# Patient Record
Sex: FEMALE | Race: BLACK OR AFRICAN AMERICAN | NOT HISPANIC OR LATINO | Employment: UNEMPLOYED | URBAN - METROPOLITAN AREA
[De-identification: names, ages, dates, MRNs, and addresses within clinical notes are randomized per-mention and may not be internally consistent; named-entity substitution may affect disease eponyms.]

---

## 2022-03-01 ENCOUNTER — OFFICE VISIT (OUTPATIENT)
Dept: FAMILY MEDICINE CLINIC | Facility: CLINIC | Age: 32
End: 2022-03-01

## 2022-03-01 VITALS
TEMPERATURE: 97.3 F | OXYGEN SATURATION: 98 % | HEIGHT: 61 IN | WEIGHT: 186 LBS | SYSTOLIC BLOOD PRESSURE: 142 MMHG | BODY MASS INDEX: 35.12 KG/M2 | HEART RATE: 80 BPM | DIASTOLIC BLOOD PRESSURE: 80 MMHG

## 2022-03-01 DIAGNOSIS — I10 PRIMARY HYPERTENSION: Primary | ICD-10-CM

## 2022-03-01 DIAGNOSIS — Z11.59 ENCOUNTER FOR HEPATITIS C SCREENING TEST FOR LOW RISK PATIENT: ICD-10-CM

## 2022-03-01 DIAGNOSIS — L70.0 ACNE VULGARIS: ICD-10-CM

## 2022-03-01 DIAGNOSIS — E66.01 MORBID (SEVERE) OBESITY DUE TO EXCESS CALORIES: ICD-10-CM

## 2022-03-01 DIAGNOSIS — R53.82 CHRONIC FATIGUE: ICD-10-CM

## 2022-03-01 PROCEDURE — 99203 OFFICE O/P NEW LOW 30 MIN: CPT | Performed by: FAMILY MEDICINE

## 2022-03-01 RX ORDER — SPIRONOLACTONE 25 MG/1
25 TABLET ORAL DAILY
Qty: 30 TABLET | Refills: 11 | Status: SHIPPED | OUTPATIENT
Start: 2022-03-01

## 2022-03-01 NOTE — PROGRESS NOTES
Subjective   Zeinab Blunt is a 31 y.o. female.     Chief Complaint   Patient presents with   • Establish Care       History of Present Illness   New patient today, establishing care.  She has history of hypertension.  Severe acne and is supposed to be on spironolactone by her allergist she wants for me to refill that.  She is also on Retin-A for her acne    The following portions of the patient's history were reviewed and updated as appropriate: allergies, current medications, past family history, past medical history, past social history, past surgical history and problem list.    Past Medical History:   Diagnosis Date   • Gestational hypertension    • Preeclampsia    • Pregnancy 2016   • Pulmonary edema     With last delivery Had PIH       Past Surgical History:   Procedure Laterality Date   •  SECTION N/A 2016    Procedure:  SECTION REPEAT;  Surgeon: Justice Tiwari MD;  Location: [facility] LABOR DELIVERY;  Service:        Family History   Family history unknown: Yes       Social History     Socioeconomic History   • Marital status: Single   Tobacco Use   • Smoking status: Never Smoker   • Smokeless tobacco: Never Used   Substance and Sexual Activity   • Alcohol use: No   • Drug use: Yes     Types: Marijuana   • Sexual activity: Yes     Partners: Male     Birth control/protection: None       Current Outpatient Medications on File Prior to Visit   Medication Sig Dispense Refill   • ibuprofen (ADVIL,MOTRIN) 600 MG tablet Take 1 tablet by mouth every 6 (six) hours as needed for mild pain (1-3) or moderate pain (4-6). 24 tablet 1     No current facility-administered medications on file prior to visit.       Review of Systems   Constitutional: Positive for fatigue.   Skin:        acne       Recent Results (from the past 4704 hour(s))   HCG, SERUM, QUALITATIVE    Collection Time: 10/12/21 11:21 AM    Specimen type and source: Serum, Blood   Result Value Ref Range    HCG Qualitative  "Negative Negative   Pregnancy, Urine -    Collection Time: 10/25/21  8:23 AM    Specimen: Urine    Specimen type and source: Urine, Urine   Result Value Ref Range    HCG Qualitative Negative Negative     Objective   Vitals:    03/01/22 1417   BP: 142/80   BP Location: Right arm   Patient Position: Sitting   Pulse: 80   Temp: 97.3 °F (36.3 °C)   SpO2: 98%   Weight: 84.4 kg (186 lb)   Height: 154.9 cm (61\")     Body mass index is 35.14 kg/m².  Physical Exam  Vitals and nursing note reviewed.   Constitutional:       General: She is not in acute distress.     Appearance: She is well-developed. She is not diaphoretic.   Cardiovascular:      Rate and Rhythm: Normal rate and regular rhythm.   Pulmonary:      Effort: Pulmonary effort is normal. No respiratory distress.      Breath sounds: Normal breath sounds. No wheezing.   Skin:     Comments: Acne           Diagnoses and all orders for this visit:    1. Primary hypertension (Primary)  -     Comprehensive Metabolic Panel  -     Lipid Panel  -     CBC & Differential  -     spironolactone (Aldactone) 25 MG tablet; Take 1 tablet by mouth Daily.  Dispense: 30 tablet; Refill: 11    2. Acne vulgaris    3. BMI 35.0-35.9,adult    4. Morbid (severe) obesity due to excess calories (HCC)    5. Encounter for hepatitis C screening test for low risk patient  -     Hepatitis C Antibody    6. Chronic fatigue  -     TSH        Return in about 3 weeks (around 3/22/2022) for HYPERTENSION.        "

## 2022-03-02 LAB
ALBUMIN SERPL-MCNC: 3.9 G/DL (ref 3.8–4.8)
ALBUMIN/GLOB SERPL: 1.6 {RATIO} (ref 1.2–2.2)
ALP SERPL-CCNC: 54 IU/L (ref 44–121)
ALT SERPL-CCNC: 11 IU/L (ref 0–32)
AST SERPL-CCNC: 17 IU/L (ref 0–40)
BASOPHILS # BLD AUTO: 0 X10E3/UL (ref 0–0.2)
BASOPHILS NFR BLD AUTO: 0 %
BILIRUB SERPL-MCNC: 0.2 MG/DL (ref 0–1.2)
BUN SERPL-MCNC: 11 MG/DL (ref 6–20)
BUN/CREAT SERPL: 16 (ref 9–23)
CALCIUM SERPL-MCNC: 8.9 MG/DL (ref 8.7–10.2)
CHLORIDE SERPL-SCNC: 103 MMOL/L (ref 96–106)
CHOLEST SERPL-MCNC: 153 MG/DL (ref 100–199)
CO2 SERPL-SCNC: 24 MMOL/L (ref 20–29)
CREAT SERPL-MCNC: 0.7 MG/DL (ref 0.57–1)
EGFR GENE MUT ANL BLD/T: 119 ML/MIN/1.73
EOSINOPHIL # BLD AUTO: 0 X10E3/UL (ref 0–0.4)
EOSINOPHIL NFR BLD AUTO: 1 %
ERYTHROCYTE [DISTWIDTH] IN BLOOD BY AUTOMATED COUNT: 12.3 % (ref 11.7–15.4)
GLOBULIN SER CALC-MCNC: 2.4 G/DL (ref 1.5–4.5)
GLUCOSE SERPL-MCNC: 88 MG/DL (ref 65–99)
HCT VFR BLD AUTO: 34.1 % (ref 34–46.6)
HCV AB S/CO SERPL IA: <0.1 S/CO RATIO (ref 0–0.9)
HDLC SERPL-MCNC: 54 MG/DL
HGB BLD-MCNC: 11.1 G/DL (ref 11.1–15.9)
IMM GRANULOCYTES # BLD AUTO: 0 X10E3/UL (ref 0–0.1)
IMM GRANULOCYTES NFR BLD AUTO: 0 %
LDLC SERPL CALC-MCNC: 86 MG/DL (ref 0–99)
LYMPHOCYTES # BLD AUTO: 2.9 X10E3/UL (ref 0.7–3.1)
LYMPHOCYTES NFR BLD AUTO: 61 %
MCH RBC QN AUTO: 27 PG (ref 26.6–33)
MCHC RBC AUTO-ENTMCNC: 32.6 G/DL (ref 31.5–35.7)
MCV RBC AUTO: 83 FL (ref 79–97)
MONOCYTES # BLD AUTO: 0.4 X10E3/UL (ref 0.1–0.9)
MONOCYTES NFR BLD AUTO: 8 %
NEUTROPHILS # BLD AUTO: 1.5 X10E3/UL (ref 1.4–7)
NEUTROPHILS NFR BLD AUTO: 30 %
PLATELET # BLD AUTO: 264 X10E3/UL (ref 150–450)
POTASSIUM SERPL-SCNC: 3.9 MMOL/L (ref 3.5–5.2)
PROT SERPL-MCNC: 6.3 G/DL (ref 6–8.5)
RBC # BLD AUTO: 4.11 X10E6/UL (ref 3.77–5.28)
SODIUM SERPL-SCNC: 140 MMOL/L (ref 134–144)
TRIGL SERPL-MCNC: 63 MG/DL (ref 0–149)
TSH SERPL DL<=0.005 MIU/L-ACNC: 1.6 UIU/ML (ref 0.45–4.5)
VLDLC SERPL CALC-MCNC: 13 MG/DL (ref 5–40)
WBC # BLD AUTO: 4.8 X10E3/UL (ref 3.4–10.8)

## 2022-04-04 ENCOUNTER — OFFICE VISIT (OUTPATIENT)
Dept: FAMILY MEDICINE CLINIC | Facility: CLINIC | Age: 32
End: 2022-04-04

## 2022-04-04 VITALS
OXYGEN SATURATION: 95 % | WEIGHT: 186.8 LBS | HEART RATE: 81 BPM | TEMPERATURE: 97.7 F | HEIGHT: 61 IN | DIASTOLIC BLOOD PRESSURE: 88 MMHG | SYSTOLIC BLOOD PRESSURE: 143 MMHG | BODY MASS INDEX: 35.27 KG/M2

## 2022-04-04 DIAGNOSIS — I10 PRIMARY HYPERTENSION: Primary | ICD-10-CM

## 2022-04-04 DIAGNOSIS — R30.9 PAINFUL URINATION: ICD-10-CM

## 2022-04-04 LAB
BILIRUB BLD-MCNC: NEGATIVE MG/DL
CLARITY, POC: CLEAR
COLOR UR: ABNORMAL
EXPIRATION DATE: ABNORMAL
GLUCOSE UR STRIP-MCNC: NEGATIVE MG/DL
KETONES UR QL: NEGATIVE
LEUKOCYTE EST, POC: ABNORMAL
Lab: ABNORMAL
NITRITE UR-MCNC: NEGATIVE MG/ML
PH UR: 6 [PH] (ref 5–8)
PROT UR STRIP-MCNC: NEGATIVE MG/DL
RBC # UR STRIP: ABNORMAL /UL
SP GR UR: 1 (ref 1–1.03)
UROBILINOGEN UR QL: NORMAL

## 2022-04-04 PROCEDURE — 81003 URINALYSIS AUTO W/O SCOPE: CPT | Performed by: FAMILY MEDICINE

## 2022-04-04 PROCEDURE — 99214 OFFICE O/P EST MOD 30 MIN: CPT | Performed by: FAMILY MEDICINE

## 2022-04-04 RX ORDER — AMLODIPINE BESYLATE 2.5 MG/1
2.5 TABLET ORAL DAILY
Qty: 30 TABLET | Refills: 11 | Status: SHIPPED | OUTPATIENT
Start: 2022-04-04

## 2022-04-04 NOTE — PROGRESS NOTES
Subjective   Zeinab Blunt is a 31 y.o. female.     Chief Complaint   Patient presents with   • Follow-up   • Hypertension       History of Present Illness   Hypertension follow-up.  It is still not controlled, borderline controlled.  Patient is not losing weight.  But she just started working out.      The following portions of the patient's history were reviewed and updated as appropriate: allergies, current medications, past family history, past medical history, past social history, past surgical history and problem list.    Past Medical History:   Diagnosis Date   • Gestational hypertension    • Preeclampsia    • Pregnancy 2016   • Pulmonary edema     With last delivery Had PIH       Past Surgical History:   Procedure Laterality Date   •  SECTION N/A 2016    Procedure:  SECTION REPEAT;  Surgeon: Justice Tiwari MD;  Location: [facility] LABOR DELIVERY;  Service:        Family History   Family history unknown: Yes       Social History     Socioeconomic History   • Marital status: Single   Tobacco Use   • Smoking status: Never Smoker   • Smokeless tobacco: Never Used   Substance and Sexual Activity   • Alcohol use: No   • Drug use: Yes     Types: Marijuana   • Sexual activity: Yes     Partners: Male     Birth control/protection: None       Current Outpatient Medications on File Prior to Visit   Medication Sig Dispense Refill   • ibuprofen (ADVIL,MOTRIN) 600 MG tablet Take 1 tablet by mouth every 6 (six) hours as needed for mild pain (1-3) or moderate pain (4-6). 24 tablet 1   • spironolactone (Aldactone) 25 MG tablet Take 1 tablet by mouth Daily. 30 tablet 11     No current facility-administered medications on file prior to visit.       Review of Systems   Constitutional: Negative.        Recent Results (from the past 4704 hour(s))   HCG, SERUM, QUALITATIVE    Collection Time: 10/12/21 11:21 AM    Specimen type and source: Serum, Blood   Result Value Ref Range    HCG Qualitative  Negative Negative   Pregnancy, Urine -    Collection Time: 10/25/21  8:23 AM    Specimen: Urine    Specimen type and source: Urine, Urine   Result Value Ref Range    HCG Qualitative Negative Negative   Comprehensive Metabolic Panel    Collection Time: 03/01/22  2:34 PM    Specimen: Blood   Result Value Ref Range    Glucose 88 65 - 99 mg/dL    BUN 11 6 - 20 mg/dL    Creatinine 0.70 0.57 - 1.00 mg/dL    EGFR Result 119 >59 mL/min/1.73    BUN/Creatinine Ratio 16 9 - 23    Sodium 140 134 - 144 mmol/L    Potassium 3.9 3.5 - 5.2 mmol/L    Chloride 103 96 - 106 mmol/L    Total CO2 24 20 - 29 mmol/L    Calcium 8.9 8.7 - 10.2 mg/dL    Total Protein 6.3 6.0 - 8.5 g/dL    Albumin 3.9 3.8 - 4.8 g/dL    Globulin 2.4 1.5 - 4.5 g/dL    A/G Ratio 1.6 1.2 - 2.2    Total Bilirubin 0.2 0.0 - 1.2 mg/dL    Alkaline Phosphatase 54 44 - 121 IU/L    AST (SGOT) 17 0 - 40 IU/L    ALT (SGPT) 11 0 - 32 IU/L   Lipid Panel    Collection Time: 03/01/22  2:34 PM    Specimen: Blood   Result Value Ref Range    Total Cholesterol 153 100 - 199 mg/dL    Triglycerides 63 0 - 149 mg/dL    HDL Cholesterol 54 >39 mg/dL    VLDL Cholesterol Charlie 13 5 - 40 mg/dL    LDL Chol Calc (NIH) 86 0 - 99 mg/dL   CBC & Differential    Collection Time: 03/01/22  2:34 PM    Specimen: Blood   Result Value Ref Range    WBC 4.8 3.4 - 10.8 x10E3/uL    RBC 4.11 3.77 - 5.28 x10E6/uL    Hemoglobin 11.1 11.1 - 15.9 g/dL    Hematocrit 34.1 34.0 - 46.6 %    MCV 83 79 - 97 fL    MCH 27.0 26.6 - 33.0 pg    MCHC 32.6 31.5 - 35.7 g/dL    RDW 12.3 11.7 - 15.4 %    Platelets 264 150 - 450 x10E3/uL    Neutrophil Rel % 30 Not Estab. %    Lymphocyte Rel % 61 Not Estab. %    Monocyte Rel % 8 Not Estab. %    Eosinophil Rel % 1 Not Estab. %    Basophil Rel % 0 Not Estab. %    Neutrophils Absolute 1.5 1.4 - 7.0 x10E3/uL    Lymphocytes Absolute 2.9 0.7 - 3.1 x10E3/uL    Monocytes Absolute 0.4 0.1 - 0.9 x10E3/uL    Eosinophils Absolute 0.0 0.0 - 0.4 x10E3/uL    Basophils Absolute 0.0 0.0 - 0.2  "x10E3/uL    Immature Granulocyte Rel % 0 Not Estab. %    Immature Grans Absolute 0.0 0.0 - 0.1 x10E3/uL   Hepatitis C Antibody    Collection Time: 03/01/22  2:34 PM    Specimen: Blood   Result Value Ref Range    Hep C Virus Ab <0.1 0.0 - 0.9 s/co ratio   TSH    Collection Time: 03/01/22  2:34 PM    Specimen: Blood   Result Value Ref Range    TSH 1.600 0.450 - 4.500 uIU/mL     Objective   Vitals:    04/04/22 0838   BP: 143/88   Pulse: 81   Temp: 97.7 °F (36.5 °C)   SpO2: 95%   Weight: 84.7 kg (186 lb 12.8 oz)   Height: 154.9 cm (60.98\")     Body mass index is 35.31 kg/m².  Physical Exam  Vitals and nursing note reviewed.   Constitutional:       General: She is not in acute distress.     Appearance: She is well-developed. She is not diaphoretic.   Cardiovascular:      Rate and Rhythm: Normal rate and regular rhythm.   Pulmonary:      Effort: Pulmonary effort is normal. No respiratory distress.      Breath sounds: Normal breath sounds. No wheezing.           Diagnoses and all orders for this visit:    1. Primary hypertension (Primary)  -     amLODIPine (Norvasc) 2.5 MG tablet; Take 1 tablet by mouth Daily.  Dispense: 30 tablet; Refill: 11    Effectiveness and potential side effects of the new blood pressure was discussed with the patient she is to continue her spironolactone.  Low-sodium diet, weight loss and physical activity was discussed in counseled in detail  Return in about 6 weeks (around 5/16/2022) for HYPERTENSION.          "

## 2022-05-23 ENCOUNTER — OFFICE VISIT (OUTPATIENT)
Dept: FAMILY MEDICINE CLINIC | Facility: CLINIC | Age: 32
End: 2022-05-23

## 2022-05-23 VITALS
BODY MASS INDEX: 34.74 KG/M2 | WEIGHT: 184 LBS | HEIGHT: 61 IN | HEART RATE: 94 BPM | DIASTOLIC BLOOD PRESSURE: 82 MMHG | TEMPERATURE: 98.2 F | OXYGEN SATURATION: 98 % | SYSTOLIC BLOOD PRESSURE: 128 MMHG

## 2022-05-23 DIAGNOSIS — Z01.818 PREOP EXAMINATION: Primary | ICD-10-CM

## 2022-05-23 DIAGNOSIS — B37.9 YEAST INFECTION: ICD-10-CM

## 2022-05-23 PROCEDURE — 93000 ELECTROCARDIOGRAM COMPLETE: CPT | Performed by: FAMILY MEDICINE

## 2022-05-23 PROCEDURE — 99214 OFFICE O/P EST MOD 30 MIN: CPT | Performed by: FAMILY MEDICINE

## 2022-05-23 RX ORDER — FLUCONAZOLE 150 MG/1
150 TABLET ORAL ONCE
Qty: 1 TABLET | Refills: 2 | Status: SHIPPED | OUTPATIENT
Start: 2022-05-23 | End: 2022-05-23

## 2022-05-23 NOTE — PROGRESS NOTES
Subjective   Zeinab Blunt is a 31 y.o. female.     Chief Complaint   Patient presents with   • Pre-op Exam     Patient is here for preop exam.  She is having plastic surgery in [place].  I have a list of all the necessary blood work that her surgeon requested an EKG.    History of Present Illness       ECG 12 Lead    Date/Time: 2022 9:35 AM  Performed by: Starla Monreal MD  Authorized by: Starla Monreal MD   Comparison: not compared with previous ECG   Rhythm: sinus rhythm  Conduction: conduction normal  Other: no other findings    Clinical impression: normal ECG            The following portions of the patient's history were reviewed and updated as appropriate: allergies, current medications, past family history, past medical history, past social history, past surgical history and problem list.    Past Medical History:   Diagnosis Date   • Gestational hypertension    • Preeclampsia    • Pregnancy 2016   • Pulmonary edema     With last delivery Had PIH       Past Surgical History:   Procedure Laterality Date   •  SECTION N/A 2016    Procedure:  SECTION REPEAT;  Surgeon: Justice Tiwari MD;  Location: [facility] LABOR DELIVERY;  Service:        Family History   Family history unknown: Yes       Social History     Socioeconomic History   • Marital status: Single   Tobacco Use   • Smoking status: Never Smoker   • Smokeless tobacco: Never Used   Substance and Sexual Activity   • Alcohol use: No   • Drug use: Yes     Types: Marijuana   • Sexual activity: Yes     Partners: Male     Birth control/protection: None       Current Outpatient Medications on File Prior to Visit   Medication Sig Dispense Refill   • amLODIPine (Norvasc) 2.5 MG tablet Take 1 tablet by mouth Daily. 30 tablet 11   • ibuprofen (ADVIL,MOTRIN) 600 MG tablet Take 1 tablet by mouth every 6 (six) hours as needed for mild pain (1-3) or moderate pain (4-6). 24 tablet 1   • spironolactone (Aldactone) 25 MG tablet Take  1 tablet by mouth Daily. 30 tablet 11     No current facility-administered medications on file prior to visit.       Review of Systems   Constitutional: Negative.        Recent Results (from the past 4704 hour(s))   Comprehensive Metabolic Panel    Collection Time: 03/01/22  2:34 PM    Specimen: Blood   Result Value Ref Range    Glucose 88 65 - 99 mg/dL    BUN 11 6 - 20 mg/dL    Creatinine 0.70 0.57 - 1.00 mg/dL    EGFR Result 119 >59 mL/min/1.73    BUN/Creatinine Ratio 16 9 - 23    Sodium 140 134 - 144 mmol/L    Potassium 3.9 3.5 - 5.2 mmol/L    Chloride 103 96 - 106 mmol/L    Total CO2 24 20 - 29 mmol/L    Calcium 8.9 8.7 - 10.2 mg/dL    Total Protein 6.3 6.0 - 8.5 g/dL    Albumin 3.9 3.8 - 4.8 g/dL    Globulin 2.4 1.5 - 4.5 g/dL    A/G Ratio 1.6 1.2 - 2.2    Total Bilirubin 0.2 0.0 - 1.2 mg/dL    Alkaline Phosphatase 54 44 - 121 IU/L    AST (SGOT) 17 0 - 40 IU/L    ALT (SGPT) 11 0 - 32 IU/L   Lipid Panel    Collection Time: 03/01/22  2:34 PM    Specimen: Blood   Result Value Ref Range    Total Cholesterol 153 100 - 199 mg/dL    Triglycerides 63 0 - 149 mg/dL    HDL Cholesterol 54 >39 mg/dL    VLDL Cholesterol Charlie 13 5 - 40 mg/dL    LDL Chol Calc (NIH) 86 0 - 99 mg/dL   CBC & Differential    Collection Time: 03/01/22  2:34 PM    Specimen: Blood   Result Value Ref Range    WBC 4.8 3.4 - 10.8 x10E3/uL    RBC 4.11 3.77 - 5.28 x10E6/uL    Hemoglobin 11.1 11.1 - 15.9 g/dL    Hematocrit 34.1 34.0 - 46.6 %    MCV 83 79 - 97 fL    MCH 27.0 26.6 - 33.0 pg    MCHC 32.6 31.5 - 35.7 g/dL    RDW 12.3 11.7 - 15.4 %    Platelets 264 150 - 450 x10E3/uL    Neutrophil Rel % 30 Not Estab. %    Lymphocyte Rel % 61 Not Estab. %    Monocyte Rel % 8 Not Estab. %    Eosinophil Rel % 1 Not Estab. %    Basophil Rel % 0 Not Estab. %    Neutrophils Absolute 1.5 1.4 - 7.0 x10E3/uL    Lymphocytes Absolute 2.9 0.7 - 3.1 x10E3/uL    Monocytes Absolute 0.4 0.1 - 0.9 x10E3/uL    Eosinophils Absolute 0.0 0.0 - 0.4 x10E3/uL    Basophils Absolute 0.0  "0.0 - 0.2 x10E3/uL    Immature Granulocyte Rel % 0 Not Estab. %    Immature Grans Absolute 0.0 0.0 - 0.1 x10E3/uL   Hepatitis C Antibody    Collection Time: 03/01/22  2:34 PM    Specimen: Blood   Result Value Ref Range    Hep C Virus Ab <0.1 0.0 - 0.9 s/co ratio   TSH    Collection Time: 03/01/22  2:34 PM    Specimen: Blood   Result Value Ref Range    TSH 1.600 0.450 - 4.500 uIU/mL   POCT urinalysis dipstick, automated    Collection Time: 04/04/22  9:32 AM    Specimen: Urine   Result Value Ref Range    Color Other (A) Yellow, Straw, Dark Yellow, Rena    Clarity, UA Clear Clear    Specific Gravity  1.000 (A) 1.005 - 1.030    pH, Urine 6.0 5.0 - 8.0    Leukocytes Large (3+) (A) Negative    Nitrite, UA Negative Negative    Protein, POC Negative Negative mg/dL    Glucose, UA Negative Negative, 1000 mg/dL (3+) mg/dL    Ketones, UA Negative Negative    Urobilinogen, UA Normal Normal    Bilirubin Negative Negative    Blood, UA 3+ (A) Negative    Lot Number [id number]     Expiration Date 10/21/23      Objective   Vitals:    05/23/22 0922   BP: 128/82   BP Location: Right arm   Patient Position: Sitting   Pulse: 94   Temp: 98.2 °F (36.8 °C)   SpO2: 98%   Weight: 83.5 kg (184 lb)   Height: 154.9 cm (60.98\")     Body mass index is 34.78 kg/m².  Physical Exam  Vitals and nursing note reviewed.   Constitutional:       General: She is not in acute distress.     Appearance: She is well-developed. She is obese. She is not diaphoretic.   Cardiovascular:      Rate and Rhythm: Normal rate and regular rhythm.   Pulmonary:      Effort: Pulmonary effort is normal. No respiratory distress.      Breath sounds: Normal breath sounds. No wheezing.           Diagnoses and all orders for this visit:    1. Preop examination (Primary)  -     ECG 12 Lead  -     Comprehensive Metabolic Panel  -     CBC & Differential  -     Urinalysis without microscopic (no culture) - Urine, Clean Catch  -     Protime-INR  -     HCG, B-subunit, " Quantitative  -     hCG, Serum, Qualitative  -     HIV-1 / O / 2 Ag / Antibody 4th Generation    2. Yeast infection  -     fluconazole (Diflucan) 150 MG tablet; Take 1 tablet by mouth 1 (One) Time for 1 dose.  Dispense: 1 tablet; Refill: 2    EKG was normal  As I have her lab results back I will fill out the clearance form and fax it to her plastic surgeon  Return if symptoms worsen or fail to improve.

## 2022-05-24 LAB
ALBUMIN SERPL-MCNC: 4.1 G/DL (ref 3.8–4.8)
ALBUMIN/GLOB SERPL: 1.4 {RATIO} (ref 1.2–2.2)
ALP SERPL-CCNC: 59 IU/L (ref 44–121)
ALT SERPL-CCNC: 10 IU/L (ref 0–32)
APPEARANCE UR: CLEAR
AST SERPL-CCNC: 13 IU/L (ref 0–40)
B-HCG SERPL QL: NORMAL MIU/ML
BASOPHILS # BLD AUTO: 0 X10E3/UL (ref 0–0.2)
BASOPHILS NFR BLD AUTO: 1 %
BILIRUB SERPL-MCNC: 0.3 MG/DL (ref 0–1.2)
BILIRUB UR QL STRIP: NEGATIVE
BUN SERPL-MCNC: 10 MG/DL (ref 6–20)
BUN/CREAT SERPL: 12 (ref 9–23)
CALCIUM SERPL-MCNC: 9.5 MG/DL (ref 8.7–10.2)
CHLORIDE SERPL-SCNC: 100 MMOL/L (ref 96–106)
CO2 SERPL-SCNC: 23 MMOL/L (ref 20–29)
COLOR UR: YELLOW
CREAT SERPL-MCNC: 0.82 MG/DL (ref 0.57–1)
EGFRCR SERPLBLD CKD-EPI 2021: 98 ML/MIN/1.73
EOSINOPHIL # BLD AUTO: 0 X10E3/UL (ref 0–0.4)
EOSINOPHIL NFR BLD AUTO: 1 %
ERYTHROCYTE [DISTWIDTH] IN BLOOD BY AUTOMATED COUNT: 13.1 % (ref 11.7–15.4)
GLOBULIN SER CALC-MCNC: 2.9 G/DL (ref 1.5–4.5)
GLUCOSE SERPL-MCNC: 89 MG/DL (ref 65–99)
GLUCOSE UR QL STRIP: NEGATIVE
HCG INTACT+B SERPL-ACNC: <1 MIU/ML
HCT VFR BLD AUTO: 36.7 % (ref 34–46.6)
HGB BLD-MCNC: 11.6 G/DL (ref 11.1–15.9)
HGB UR QL STRIP: NEGATIVE
HIV 1+2 AB+HIV1 P24 AG SERPL QL IA: NON REACTIVE
IMM GRANULOCYTES # BLD AUTO: 0 X10E3/UL (ref 0–0.1)
IMM GRANULOCYTES NFR BLD AUTO: 0 %
INR PPP: 0.9 (ref 0.9–1.2)
KETONES UR QL STRIP: NEGATIVE
LEUKOCYTE ESTERASE UR QL STRIP: NEGATIVE
LYMPHOCYTES # BLD AUTO: 2.1 X10E3/UL (ref 0.7–3.1)
LYMPHOCYTES NFR BLD AUTO: 49 %
MCH RBC QN AUTO: 26.5 PG (ref 26.6–33)
MCHC RBC AUTO-ENTMCNC: 31.6 G/DL (ref 31.5–35.7)
MCV RBC AUTO: 84 FL (ref 79–97)
MONOCYTES # BLD AUTO: 0.4 X10E3/UL (ref 0.1–0.9)
MONOCYTES NFR BLD AUTO: 9 %
NEUTROPHILS # BLD AUTO: 1.7 X10E3/UL (ref 1.4–7)
NEUTROPHILS NFR BLD AUTO: 40 %
NITRITE UR QL STRIP: NEGATIVE
PH UR STRIP: 7.5 [PH] (ref 5–7.5)
PLATELET # BLD AUTO: 282 X10E3/UL (ref 150–450)
POTASSIUM SERPL-SCNC: 4.4 MMOL/L (ref 3.5–5.2)
PROT SERPL-MCNC: 7 G/DL (ref 6–8.5)
PROT UR QL STRIP: NEGATIVE
PROTHROMBIN TIME: 10.1 SEC (ref 9.1–12)
RBC # BLD AUTO: 4.37 X10E6/UL (ref 3.77–5.28)
SODIUM SERPL-SCNC: 138 MMOL/L (ref 134–144)
SP GR UR STRIP: 1.02 (ref 1–1.03)
UROBILINOGEN UR STRIP-MCNC: 1 MG/DL (ref 0.2–1)
WBC # BLD AUTO: 4.2 X10E3/UL (ref 3.4–10.8)

## 2022-05-25 ENCOUNTER — TELEPHONE (OUTPATIENT)
Dept: FAMILY MEDICINE CLINIC | Facility: CLINIC | Age: 32
End: 2022-05-25

## 2022-05-25 NOTE — TELEPHONE ENCOUNTER
Patient is having some cosmetic surgery done in [place].  The surgeon is telling her she needs to get a APTT test done. She has to have the labs all done and bring the results with her to [place].  She wants to know if Dr Monreal could order this please?  Her phone number is [phone].

## 2022-05-26 DIAGNOSIS — Z01.818 PREOP EXAMINATION: Primary | ICD-10-CM

## 2022-05-26 LAB
SPECIMEN STATUS: NORMAL
WRITTEN AUTHORIZATION: NORMAL

## 2022-05-26 NOTE — TELEPHONE ENCOUNTER
Patient called back and says the blood work required has to do with clotting of the blood. Please call

## 2022-05-27 ENCOUNTER — LAB (OUTPATIENT)
Dept: FAMILY MEDICINE CLINIC | Facility: CLINIC | Age: 32
End: 2022-05-27

## 2022-05-27 DIAGNOSIS — Z01.818 PREOP EXAMINATION: ICD-10-CM

## 2022-05-28 LAB — APTT PPP: 28 SEC (ref 24–33)

## 2022-06-10 ENCOUNTER — OFFICE VISIT (OUTPATIENT)
Dept: FAMILY MEDICINE CLINIC | Facility: CLINIC | Age: 32
End: 2022-06-10

## 2022-06-10 VITALS
TEMPERATURE: 98.4 F | HEART RATE: 79 BPM | BODY MASS INDEX: 35.05 KG/M2 | DIASTOLIC BLOOD PRESSURE: 88 MMHG | OXYGEN SATURATION: 100 % | SYSTOLIC BLOOD PRESSURE: 120 MMHG | WEIGHT: 185.4 LBS

## 2022-06-10 DIAGNOSIS — N89.8 VAGINAL DISCHARGE: ICD-10-CM

## 2022-06-10 DIAGNOSIS — R30.0 DYSURIA: Primary | ICD-10-CM

## 2022-06-10 PROBLEM — B37.31 CANDIDIASIS OF VAGINA: Status: ACTIVE | Noted: 2022-01-08

## 2022-06-10 PROCEDURE — 99213 OFFICE O/P EST LOW 20 MIN: CPT | Performed by: FAMILY MEDICINE

## 2022-06-10 RX ORDER — FLUCONAZOLE 150 MG/1
150 TABLET ORAL ONCE
Qty: 7 TABLET | Refills: 0 | Status: SHIPPED | OUTPATIENT
Start: 2022-06-10 | End: 2022-06-10

## 2022-06-10 NOTE — PROGRESS NOTES
Subjective   Zeinab Blunt is a 31 y.o. female.     Chief Complaint   Patient presents with   • Difficulty Urinating       History of Present Illness   Having some dysuria and white vaginal discharge.  No bleeding.  This is been going on for a few days. It is itching and she has been scratching and she thinks this is why is burning. Has taken a diflucan and still having issues. Thinks it is a yeast infection. Started after using a different soap. Could not leave a u/a.     The following portions of the patient's history were reviewed and updated as appropriate: allergies, current medications, past family history, past medical history, past social history, past surgical history and problem list.    Past Medical History:   Diagnosis Date   • Gestational hypertension    • Preeclampsia    • Pregnancy 2016   • Pulmonary edema     With last delivery Had PIH       Past Surgical History:   Procedure Laterality Date   •  SECTION N/A 2016    Procedure:  SECTION REPEAT;  Surgeon: Justice Tiwari MD;  Location: [facility] LABOR DELIVERY;  Service:        Family History   Family history unknown: Yes       Social History     Socioeconomic History   • Marital status: Single   Tobacco Use   • Smoking status: Never Smoker   • Smokeless tobacco: Never Used   Substance and Sexual Activity   • Alcohol use: No   • Drug use: Yes     Types: Marijuana   • Sexual activity: Yes     Partners: Male     Birth control/protection: None       Review of Systems   Constitutional: Negative for fever.       Objective   Visit Vitals  /88 (BP Location: Left arm, Patient Position: Standing, Cuff Size: Large Adult)   Pulse 79   Temp 98.4 °F (36.9 °C) (Temporal)   Wt 84.1 kg (185 lb 6.4 oz)   SpO2 100%   BMI 35.05 kg/m²     Body mass index is 35.05 kg/m².  Physical Exam  Constitutional:       General: She is not in acute distress.     Appearance: Normal appearance.   Neurological:      General: No focal deficit present.       Mental Status: She is alert and oriented to person, place, and time.   Psychiatric:         Mood and Affect: Mood normal.         Behavior: Behavior normal.           Assessment & Plan   Diagnoses and all orders for this visit:    1. Dysuria (Primary)    2. Vaginal discharge  -     fluconazole (Diflucan) 150 MG tablet; Take 1 tablet by mouth 1 (One) Time for 1 dose.  Dispense: 7 tablet; Refill: 0  -     NuSwab Vaginitis (VG) - Swab, Vagina          F/u if worse or no better.

## 2022-06-14 LAB
A VAGINAE DNA VAG QL NAA+PROBE: ABNORMAL SCORE
BVAB2 DNA VAG QL NAA+PROBE: ABNORMAL SCORE
C ALBICANS DNA VAG QL NAA+PROBE: NEGATIVE
C GLABRATA DNA VAG QL NAA+PROBE: NEGATIVE
MEGA1 DNA VAG QL NAA+PROBE: ABNORMAL SCORE
T VAGINALIS DNA VAG QL NAA+PROBE: NEGATIVE

## 2022-06-14 RX ORDER — METRONIDAZOLE 500 MG/1
500 TABLET ORAL 2 TIMES DAILY
Qty: 14 TABLET | Refills: 0 | Status: SHIPPED | OUTPATIENT
Start: 2022-06-14